# Patient Record
Sex: MALE | Race: WHITE | NOT HISPANIC OR LATINO | Employment: OTHER
[De-identification: names, ages, dates, MRNs, and addresses within clinical notes are randomized per-mention and may not be internally consistent; named-entity substitution may affect disease eponyms.]

---

## 2017-08-15 NOTE — PATIENT DISCUSSION
1. Combined Types of Cataract OU: Explained how cataracts can effect vision. Recommend clinical observation. The patient was advised to contact us if any change or worsening of vision. 2. PXE OU:  Continue with current treatment plan. Discussed importance of compliance. Will continue to monitor. 3.  Return for an appointment in 6 months for comprehensive exam. OCT Nerve. with Dr. Harry Radford.

## 2017-08-24 NOTE — PATIENT DISCUSSION
1.  Return for an appointment in 6 months for comprehensive exam. OCT Nerve. with Dr. Angelita Clemente. 2.   Continue present contact lens modality but adjust power OD. Stop/wear and call if any redness pain or decrease in vision occur.

## 2018-02-23 NOTE — PATIENT DISCUSSION
1.  PXE OU:  Continue with current treatment plan. Discussed importance of compliance. Will continue to monitor. 2.  Combined Types of Cataract OU: Explained how cataracts can effect vision. Recommend clinical observation. The patient was advised to contact us if any change or worsening of vision. 3. Risk of glaucoma and zonular problems with cataract surgery reviewed. Monitor 4. Return for an appointment in 6 months for pressure check. VF 24-2. with Dr. Zaki Tyler.

## 2018-08-21 NOTE — PATIENT DISCUSSION
Dry Eye OU:  Continue current management with Artificial Tears.   More discomfort with CL f/u Dr Ronald Quach for possible refit

## 2018-08-21 NOTE — PATIENT DISCUSSION
1.  PXE OU:  Continue with current treatment plan. Discussed importance of compliance. Will continue to monitor. 2.  Combined Types of Cataract OU: Explained how cataracts can effect vision. Recommend clinical observation. The patient was advised to contact us if any change or worsening of vision. 3. Dry Eye OU:  Continue current management with Artificial Tears. More discomfort with CL f/u Dr Porsche Coreas for possible refit4. Return for an appointment in 6 months for comprehensive exam. OCT Nerve. with Dr. Barbar Castleman.

## 2018-10-24 NOTE — PATIENT DISCUSSION
1.  Discussed the risks benefits alternatives and limitations of cataract surgery including infection bleeding loss of vision retinal tears detachment. The patient stated a full understanding and a desire to proceed with the procedure in both eyes. Refractive options were reviewed. Patient has elected to be optimized for distance vision in both eyes. The patient will still need glasses for reading and to possibly fine tune distance vision. Pt will like to proceed with surgery . OD first OS to follow. COMPLEX SURGERY will need Iris hooks Malyugin  ring ( 3 piece IOL ) 2.   PXE OD - higher risk of IOL dislication and dropped cataract is discussed

## 2018-10-24 NOTE — PATIENT DISCUSSION
PXE OD - Continue with current treatment plan. Discussed importance of compliance. Will continue to monitor.

## 2018-11-05 NOTE — PATIENT DISCUSSION
Discussed the risks benefits alternatives and limitations of cataract surgery including infection bleeding loss of vision retinal tears detachment. The patient stated a full understanding and a desire to proceed with the procedure in both eyes. Refractive options were reviewed. Patient has elected to be optimized for distance vision in both eyes. The patient will still need glasses for reading and to possibly fine tune distance vision. OD ONLY DISTANCE CORRECTIONrecommended STANDARD THREE PIECE IOL OD LORENA TO PSEUDOEXFOLIATION. RECOMMENDED LASER ASSISTED SURGERY. HUGHER CHANCE OF COMPLICATIONS DUE TO PXF DISCUSSED

## 2018-11-15 NOTE — PATIENT DISCUSSION
1.  Post-Op Day #1 - Cataract Surgery Right Eye (OD) - doing well. Tears prn. Continue postop drops as directed. Call office with symptoms of pain redness or decreased vision in operative eye.2. Nuclear Sclerotic Cataract OS: Discussed the risks benefits alternatives and limitations of cataract surgery including infection bleeding loss of vision retinal tears detachment. The patient stated a full understanding and a desire to proceed with the procedure in the left eye. Refractive options were reviewed. Patient has elected to be optimized for distance vision in the left eye. The patient will still need glasses for reading and to possibly fine tune distance vision. Schedule KPE/IOL OS after the Holidays. Consider toric Multifocal IOL

## 2018-11-21 NOTE — PATIENT DISCUSSION
Post-Op Week #1 - Cataract Surgery Right Eye (OD) - Intraocular lens stable and surgery very well healed. Patient to resume all normal activities. Finish postop drops as directed. Final Refraction given if necessary. Call with any problems. WANTS TO SCHEDULE OS FOR CATARACT SURGERYReturn for an appointment in 2 month for post op exam. with Dr. Trae Avalos.

## 2018-11-30 ENCOUNTER — NEW PATIENT COMPREHENSIVE (OUTPATIENT)
Age: 53
End: 2018-11-30

## 2018-11-30 DIAGNOSIS — H52.4: ICD-10-CM

## 2018-11-30 DIAGNOSIS — H52.13: ICD-10-CM

## 2018-11-30 DIAGNOSIS — H52.203: ICD-10-CM

## 2018-11-30 PROCEDURE — 92015 DETERMINE REFRACTIVE STATE: CPT

## 2018-11-30 PROCEDURE — 92004 COMPRE OPH EXAM NEW PT 1/>: CPT

## 2018-11-30 PROCEDURE — 92310-2 LEVEL 2 CONTACT LENS MANAGEMENT

## 2018-11-30 ASSESSMENT — TONOMETRY
OS_IOP_MMHG: 18
OD_IOP_MMHG: 19

## 2018-11-30 ASSESSMENT — KERATOMETRY
OD_K2POWER_DIOPTERS: 43.50
OS_K1POWER_DIOPTERS: 40.25
OS_K2POWER_DIOPTERS: 42.75
OS_AXISANGLE2_DEGREES: 083
OD_AXISANGLE_DEGREES: 002
OS_AXISANGLE_DEGREES: 173
OD_AXISANGLE2_DEGREES: 092
OD_K1POWER_DIOPTERS: 40.50

## 2018-11-30 ASSESSMENT — VISUAL ACUITY
OD_SC: CF 4FT
OS_SC: 20/400

## 2018-12-17 NOTE — PATIENT DISCUSSION
Post-Op Cataract Surgery 15-90 days Right Eye (OD):  Doing well with stable vision. Restart Prednisolone OD Q2 hours FOR 10 DAYS THEN QID.  The patient is out of town till 1/4/19 - to see me after that

## 2019-01-07 NOTE — PATIENT DISCUSSION
Cataract OS: Discussed the risks benefits alternatives and limitations of cataract surgery including infection bleeding loss of vision retinal tears detachment. The patient stated a full understanding and a desire to proceed with the procedure in the left eye. Refractive options were reviewed. Pt has elected MFIOL with femto assist and ORA guidance to optimize lens power choice. The pt may still need glasses to possibly fine tune uncorrected vision. The patient understands the risk of glare and halo at night.

## 2019-01-07 NOTE — PATIENT DISCUSSION
1.  Post-Op Cataract Surgery 15-90 days Right Eye (OD):  Doing well with stable vision. 2.  Cataract OS: Discussed the risks benefits alternatives and limitations of cataract surgery including infection bleeding loss of vision retinal tears detachment. The patient stated a full understanding and a desire to proceed with the procedure in the left eye. Refractive options were reviewed. Pt has elected MFIOL with femto assist and ORA guidance to optimize lens power choice. The pt may still need glasses to possibly fine tune uncorrected vision. The patient understands the risk of glare and halo at night. Potential for developing pseudoexfoliations later on discussed and thus IOL dislocation later on - small risk.  She understands

## 2019-01-15 NOTE — PATIENT DISCUSSION
Post-Op Cataract Surgery 15-90 days Right Eye (OD):  Doing well with stable vision. Rebound iritis resolved. Taper pred eyedrops over 10 days. Call if any problem.  Schedule OS cataract Sx

## 2019-02-26 NOTE — PATIENT DISCUSSION
1.  Pseudophakia monofocal distance OD - IOL stable. Monitor. 2. Post-Op Day #1 - Cataract Surgery Left Eye (OS) Restor - doing well. Tears prn. Continue postop drops as directed. Call office with symptoms of pain redness or decreased vision in operative eye.  1 drop of zylet instilled3. Return for an appointment in 1 week for post op exam. with Dr. Isrrael Ramirez.

## 2019-02-26 NOTE — PATIENT DISCUSSION
1.  Post-Op Day #1 - Cataract Surgery Left Eye (OS) Restor - doing well. Tears prn. Continue postop drops as directed. Call office with symptoms of pain redness or decreased vision in operative eye.  1 drop of zylet instilled2. Return for an appointment in 1 week for post op exam. with Dr. Gonzalez Wells.

## 2019-03-06 NOTE — PATIENT DISCUSSION
1.  Post-Op Week #1 - Cataract Surgery Left Eye (OS) MF Restor IOL -  Intraocular lens stable and surgery very well healed. Patient to resume all normal activities. Finish postop drops as directed. Final Refraction given if necessary. Call with any problems. 2. Keratoconjunctivitis Sicca OU:  Refresh Repair at least QID. 3. Return for an appointment in 2 weeks for post op exam. with Dr. Kelli Cai

## 2019-03-26 NOTE — PATIENT DISCUSSION
Post-Op Cataract Surgery 15-90 days Left Eye (OS)-  Doing well but vision is not quite as sharp as OD. OCT - bilateral VMT. Will refer to Jese Harris for f/u. discussed with the patient.  F/u 4 months with AES

## 2019-07-17 NOTE — PATIENT DISCUSSION
1.  Pseudophakia OU - IOLs stable. Monitor. 2. VMT OCT done today Monitor. Stop Lumigan after October. F/u 8 months - IOP check. Normal c/d ratios.

## 2019-12-06 NOTE — PATIENT DISCUSSION
Ocular HTN OU:  Elevated intraocular pressure without signs of glaucomatous damage to the optic nerve. Will continue to monitor for development of glaucoma. CE and OCT of optic nerves in 6 months

## 2020-06-15 NOTE — PATIENT DISCUSSION
1.  Pseudophakia OU - IOLs stable. Monitor for changes in vision. 2. Ocular HTN OU:  Elevated intraocular pressure without signs of glaucomatous damage to the optic nerve. Will continue to monitor for development of glaucoma. CE and OCT of optic nerves in 6 months3. Mechanical Ptosis OU --  The patient has droopy upper eyelids bilaterally which intefers with vision. Surgical correction of the ptosis was recommended. Will need photos and  VF if desires. Had a cosmetic UL Bleph in the past

## 2020-06-29 NOTE — PATIENT DISCUSSION
Mechanical Ptosis OU --  The patient has droopy upper eyelids bilaterally which intefers with vision. Surgical correction of the ptosis was recommended. Shcedule bilateral upper eyelid ptosis repair - levator aponeurosis advancement

## 2020-07-20 NOTE — PATIENT DISCUSSION
Post Operative:  2 weeks post bilateral UL ptosis repair Doing well po drops as instructed tears prn. Call with any problems. 4 months

## 2021-05-13 ENCOUNTER — NEW PATIENT COMPREHENSIVE (OUTPATIENT)
Dept: URBAN - METROPOLITAN AREA CLINIC 38 | Facility: CLINIC | Age: 56
End: 2021-05-13

## 2021-05-13 DIAGNOSIS — H52.13: ICD-10-CM

## 2021-05-13 DIAGNOSIS — H52.203: ICD-10-CM

## 2021-05-13 DIAGNOSIS — H52.4: ICD-10-CM

## 2021-05-13 DIAGNOSIS — H10.45: ICD-10-CM

## 2021-05-13 DIAGNOSIS — H18.823: ICD-10-CM

## 2021-05-13 PROCEDURE — 92310-1 LEVEL 1 CONTACT LENS MANAGEMENT

## 2021-05-13 PROCEDURE — 92015 DETERMINE REFRACTIVE STATE: CPT

## 2021-05-13 PROCEDURE — 92004 COMPRE OPH EXAM NEW PT 1/>: CPT

## 2021-05-13 ASSESSMENT — VISUAL ACUITY
OD_CC: J10
OS_CC: J2-
OD_CC: 20/25+2
OS_OTHER: OR -.50 20/25
OU_OTHER: 20/20 OU
OS_CC: 20/40
OU_CC: J2

## 2021-05-13 ASSESSMENT — KERATOMETRY
OD_K2POWER_DIOPTERS: 43.50
OS_K2POWER_DIOPTERS: 42.75
OS_AXISANGLE_DEGREES: 173
OD_AXISANGLE2_DEGREES: 092
OD_AXISANGLE_DEGREES: 002
OS_K1POWER_DIOPTERS: 40.25
OD_K1POWER_DIOPTERS: 40.50
OS_AXISANGLE2_DEGREES: 083

## 2021-05-13 ASSESSMENT — TONOMETRY
OD_IOP_MMHG: 20
OS_IOP_MMHG: 19

## 2021-06-02 NOTE — PATIENT DISCUSSION
1.  Pseudophakia OU - IOLs stable. Monitor for changes in vision. 2. Ocular HTN OU:  Elevated intraocular pressure without signs of glaucomatous damage to the optic nerve. Will continue to monitor for development of glaucoma. Can use Zaditor for occational itching. Return for an appointment in 1 year for comprehensive exam. with Dr. Lina Shukla.

## 2021-06-14 ENCOUNTER — RX CHANGE - NO APPT (OUTPATIENT)
Dept: URBAN - METROPOLITAN AREA CLINIC 38 | Facility: CLINIC | Age: 56
End: 2021-06-14

## 2021-06-14 ASSESSMENT — KERATOMETRY
OD_K1POWER_DIOPTERS: 40.50
OS_K1POWER_DIOPTERS: 40.25
OS_AXISANGLE_DEGREES: 173
OD_AXISANGLE_DEGREES: 002
OD_AXISANGLE2_DEGREES: 092
OS_K2POWER_DIOPTERS: 42.75
OD_K2POWER_DIOPTERS: 43.50
OS_AXISANGLE2_DEGREES: 083

## 2022-06-22 NOTE — PATIENT DISCUSSION
1.  Pseudophakia OU - IOLs stable. Monitor for changes in vision. 2. Ocular HTN OU:  Elevated intraocular pressure without signs of glaucomatous damage to the optic nerve. Will continue to monitor for development of glaucoma. Can use Zaditor for occational itching. Return for an appointment in 1 year for comprehensive exam. with Dr. Kaylie Morris.

## 2024-06-25 ENCOUNTER — NEW PATIENT (OUTPATIENT)
Dept: URBAN - METROPOLITAN AREA CLINIC 39 | Facility: CLINIC | Age: 59
End: 2024-06-25

## 2024-06-25 DIAGNOSIS — H43.813: ICD-10-CM

## 2024-06-25 DIAGNOSIS — H35.711: ICD-10-CM

## 2024-06-25 DIAGNOSIS — H25.813: ICD-10-CM

## 2024-06-25 PROCEDURE — 92134 CPTRZ OPH DX IMG PST SGM RTA: CPT | Mod: NC

## 2024-06-25 PROCEDURE — V2799PMN IMPRIMIS PRED-MOXI-NEPAF 5ML

## 2024-06-25 PROCEDURE — 92015 DETERMINE REFRACTIVE STATE: CPT | Mod: NC

## 2024-06-25 PROCEDURE — 99204 OFFICE O/P NEW MOD 45 MIN: CPT | Mod: NC

## 2024-06-25 PROCEDURE — 92286 ANT SGM IMG I&R SPECLR MIC: CPT | Mod: NC

## 2024-06-25 PROCEDURE — 92025 CPTRIZED CORNEAL TOPOGRAPHY: CPT | Mod: NC

## 2024-06-25 PROCEDURE — 92136 OPHTHALMIC BIOMETRY: CPT | Mod: NC

## 2024-06-25 ASSESSMENT — VISUAL ACUITY
OS_CC: CF 6FT
OD_BAT: 20/100
OD_SC: J1+
OD_CC: 20/25-1
OD_SC: 20/400
OS_CC: <J10
OS_BAT: <20/400
OD_CC: J2
OS_SC: CF 3FT

## 2024-06-25 ASSESSMENT — TONOMETRY
OS_IOP_MMHG: 16
OD_IOP_MMHG: 16

## 2024-07-01 ENCOUNTER — PRE-OP/H&P (OUTPATIENT)
Dept: URBAN - METROPOLITAN AREA CLINIC 39 | Facility: CLINIC | Age: 59
End: 2024-07-01

## 2024-07-01 ENCOUNTER — SURGERY/PROCEDURE (OUTPATIENT)
Facility: LOCATION | Age: 59
End: 2024-07-01

## 2024-07-01 DIAGNOSIS — H25.813: ICD-10-CM

## 2024-07-01 PROCEDURE — 99211HP PRE-OP

## 2024-07-01 PROCEDURE — 66999RB CLE BASIC VISION

## 2024-07-02 ENCOUNTER — SURGERY/PROCEDURE (OUTPATIENT)
Facility: LOCATION | Age: 59
End: 2024-07-02

## 2024-07-02 ENCOUNTER — POST-OP (OUTPATIENT)
Dept: URBAN - METROPOLITAN AREA CLINIC 39 | Facility: CLINIC | Age: 59
End: 2024-07-02

## 2024-07-02 ENCOUNTER — PRE-OP/H&P (OUTPATIENT)
Dept: URBAN - METROPOLITAN AREA CLINIC 39 | Facility: CLINIC | Age: 59
End: 2024-07-02

## 2024-07-02 DIAGNOSIS — H25.812: ICD-10-CM

## 2024-07-02 DIAGNOSIS — Z96.1: ICD-10-CM

## 2024-07-02 DIAGNOSIS — H25.811: ICD-10-CM

## 2024-07-02 PROCEDURE — 99211T TECH SERVICE

## 2024-07-02 PROCEDURE — 99211HP PRE-OP

## 2024-07-02 PROCEDURE — 66984 XCAPSL CTRC RMVL W/O ECP: CPT | Mod: 79,RT

## 2024-07-02 ASSESSMENT — TONOMETRY: OD_IOP_MMHG: 10

## 2025-06-17 ENCOUNTER — EMERGENCY VISIT (OUTPATIENT)
Age: 60
End: 2025-06-17

## 2025-06-17 DIAGNOSIS — H16.002: ICD-10-CM

## 2025-06-17 PROCEDURE — 99214 OFFICE O/P EST MOD 30 MIN: CPT

## 2025-06-17 RX ORDER — MOXIFLOXACIN OPHTHALMIC 5 MG/ML: 1 SOLUTION/ DROPS OPHTHALMIC

## 2025-06-17 RX ORDER — POLYMYXIN B SULFATE AND TRIMETHOPRIM 1; 10000 MG/ML; [USP'U]/ML: 1 SOLUTION OPHTHALMIC

## 2025-06-17 RX ORDER — ERYTHROMYCIN 5 MG/G: OINTMENT OPHTHALMIC

## 2025-06-19 ENCOUNTER — FOLLOW UP (OUTPATIENT)
Age: 60
End: 2025-06-19

## 2025-06-19 DIAGNOSIS — H16.002: ICD-10-CM

## 2025-06-19 PROCEDURE — 99213 OFFICE O/P EST LOW 20 MIN: CPT

## 2025-06-19 RX ORDER — PREDNISOLONE ACETATE 10 MG/ML: 1 SUSPENSION/ DROPS OPHTHALMIC

## 2025-06-26 ENCOUNTER — FOLLOW UP (OUTPATIENT)
Age: 60
End: 2025-06-26

## 2025-06-26 DIAGNOSIS — Z96.1: ICD-10-CM

## 2025-06-26 DIAGNOSIS — H16.002: ICD-10-CM

## 2025-06-26 PROCEDURE — 99213 OFFICE O/P EST LOW 20 MIN: CPT
